# Patient Record
Sex: FEMALE | Race: WHITE
[De-identification: names, ages, dates, MRNs, and addresses within clinical notes are randomized per-mention and may not be internally consistent; named-entity substitution may affect disease eponyms.]

---

## 2021-09-29 ENCOUNTER — HOSPITAL ENCOUNTER (EMERGENCY)
Dept: HOSPITAL 26 - MED | Age: 26
Discharge: HOME | End: 2021-09-29
Payer: COMMERCIAL

## 2021-09-29 VITALS — SYSTOLIC BLOOD PRESSURE: 113 MMHG | DIASTOLIC BLOOD PRESSURE: 76 MMHG

## 2021-09-29 VITALS — HEIGHT: 56 IN | BODY MASS INDEX: 22.5 KG/M2 | WEIGHT: 100 LBS

## 2021-09-29 DIAGNOSIS — M54.5: ICD-10-CM

## 2021-09-29 DIAGNOSIS — N39.0: Primary | ICD-10-CM

## 2021-09-29 LAB
APPEARANCE UR: (no result)
BILIRUB UR QL STRIP: (no result)
COLOR UR: (no result)
GLUCOSE UR STRIP-MCNC: NEGATIVE MG/DL
HGB UR QL STRIP: (no result)
LEUKOCYTE ESTERASE UR QL STRIP: (no result)
NITRITE UR QL STRIP: POSITIVE
PH UR STRIP: 6.5 [PH] (ref 5–9)
RBC #/AREA URNS HPF: (no result) /HPF (ref 0–5)
WBC,URINE: (no result) /HPF (ref 0–5)

## 2021-09-29 PROCEDURE — 96372 THER/PROPH/DIAG INJ SC/IM: CPT

## 2021-09-29 PROCEDURE — 99284 EMERGENCY DEPT VISIT MOD MDM: CPT

## 2021-09-29 PROCEDURE — 81001 URINALYSIS AUTO W/SCOPE: CPT

## 2021-09-29 PROCEDURE — 81025 URINE PREGNANCY TEST: CPT

## 2021-09-29 PROCEDURE — 87086 URINE CULTURE/COLONY COUNT: CPT

## 2021-09-29 PROCEDURE — 74176 CT ABD & PELVIS W/O CONTRAST: CPT

## 2021-09-29 NOTE — NUR
Patient discharged with v/s stable. Written and verbal after care instructions 
given and explained. 

Patient alert, oriented and verbalized understanding of instructions. 
Ambulatory with steady gait. All questions addressed prior to discharge. ID 
band removed. Patient advised to follow up with PMD. Rx of KEFLEX, ZOFRAN AND 
PYRIDIUM given. Patient educated on indication of medication including possible 
reaction and side effects. Opportunity to ask questions provided and answered.

## 2021-09-29 NOTE — NUR
PT BIBS FOR C/C BLOOD IN URINE X 4 HOURS. +PAIN WITH URINATION, FREQUENCY AND 
BURNING. SUPRAPUBIC PAIN RADIATES TO RIGHT FLANK, WITH MILD TENDERNESS TO RT 
LOWER BACK. DENIES FEVER, CHILLS, N/V/D, CP OR SOB. ABDOMEN IS SOFT, FLAT AND 
TENDER TO RLQ. 



MED HX: ANEMIA, UTI

ALLERGIES: NKA